# Patient Record
Sex: FEMALE | URBAN - METROPOLITAN AREA
[De-identification: names, ages, dates, MRNs, and addresses within clinical notes are randomized per-mention and may not be internally consistent; named-entity substitution may affect disease eponyms.]

---

## 2021-02-05 ENCOUNTER — APPOINTMENT (RX ONLY)
Dept: URBAN - METROPOLITAN AREA CLINIC 87 | Facility: CLINIC | Age: 36
Setting detail: DERMATOLOGY
End: 2021-02-05

## 2021-02-05 DIAGNOSIS — L81.7 PIGMENTED PURPURIC DERMATOSIS: ICD-10-CM

## 2021-02-05 PROCEDURE — 99202 OFFICE O/P NEW SF 15 MIN: CPT

## 2021-02-05 PROCEDURE — ? COUNSELING

## 2021-02-05 PROCEDURE — ? ADDITIONAL NOTES

## 2021-02-05 NOTE — PROCEDURE: ADDITIONAL NOTES
Additional Notes: Recommended support hose for the legs. Patient defers other treatments due to trying to conceive.\\nOther possibilities include petechiae from low plt, but pt reports that her blood work was WNL about 3 months ago.
Render Risk Assessment In Note?: no
Detail Level: Zone

## 2021-02-05 NOTE — HPI: RASH
What Type Of Note Output Would You Prefer (Optional)?: Standard Output
Is The Patient Presenting As Previously Scheduled?: Yes
How Severe Is Your Rash?: mild
Is This A New Presentation, Or A Follow-Up?: Rash
Additional History: Pt reports that she noticed some lesions when she was pregnant, but the rash became more noticeable about a year after pregnancy. She reports that she likes walking and has gained some weight with pregnancy and post pregnancy. She has had labs done about 3 months ago and the PCP said she was WNL.